# Patient Record
Sex: FEMALE | Race: WHITE | NOT HISPANIC OR LATINO
[De-identification: names, ages, dates, MRNs, and addresses within clinical notes are randomized per-mention and may not be internally consistent; named-entity substitution may affect disease eponyms.]

---

## 2021-02-10 ENCOUNTER — APPOINTMENT (OUTPATIENT)
Dept: UROLOGY | Facility: CLINIC | Age: 61
End: 2021-02-10
Payer: COMMERCIAL

## 2021-02-10 VITALS
WEIGHT: 107 LBS | HEIGHT: 60 IN | DIASTOLIC BLOOD PRESSURE: 94 MMHG | SYSTOLIC BLOOD PRESSURE: 144 MMHG | BODY MASS INDEX: 21.01 KG/M2 | HEART RATE: 52 BPM | TEMPERATURE: 97.6 F

## 2021-02-10 DIAGNOSIS — N39.8 OTHER SPECIFIED DISORDERS OF URINARY SYSTEM: ICD-10-CM

## 2021-02-10 PROCEDURE — 51798 US URINE CAPACITY MEASURE: CPT

## 2021-02-10 PROCEDURE — 99072 ADDL SUPL MATRL&STAF TM PHE: CPT

## 2021-02-10 PROCEDURE — 99204 OFFICE O/P NEW MOD 45 MIN: CPT | Mod: 25

## 2021-02-10 NOTE — LETTER BODY
[Dear  ___] : Dear ~EDGAR, [Courtesy Letter:] : I had the pleasure of seeing your patient, [unfilled], in my office today. [Please see my note below.] : Please see my note below. [Consult Closing:] : Thank you very much for allowing me to participate in the care of this patient.  If you have any questions, please do not hesitate to contact me. [Sincerely,] : Sincerely, [FreeTextEntry2] : Noemi Srinivasan NP\par [address]\[place] [address] [FreeTextEntry3] : Alex Banks MD, FACS\par Urologic Oncology\par Department of Urology\par [facility]\par \par [employer] [facility] \par \par

## 2021-02-10 NOTE — HISTORY OF PRESENT ILLNESS
[Bladder Spasm] : bladder spasm [Weight Loss] : no weight loss [FreeTextEntry1] : This is a 60 year old woman being seen to for UPJ obstruction.  She has had an extensive work up due to her history of melanoma and a 15 pound weight loss last year.\par \par CTU in Jan 2021 demonstrates mild hydronephrosis and delay in nephrogram with suggestion of partial UPJ obstruction.\par Cr is 1.78 back in September 2020 \par NM renal scan shows moderate to sever left sided UPJ obstruction prior to Lasix and partial clearance following Lasix.\par Split function 57 % left and 43 % right \par \par \par She also is concerned about some bladder pain that has been ongoing for years.  If she waits to long to urinate, or empty her bladder she develops spasms after voiding.  The spasms subside without intervention after about 15 minutes.  \par She denies any hematuria, dysuria, or difficulty starting urinary stream \par \par Surgical Hx: melanoma removal right should 2006, sinus surgery x 2\par Social Hx: nonsmoker, occasional ETOH uses, works as an RN \par Family Hx: no urological cancers [Urinary Incontinence] : no urinary incontinence [Urinary Retention] : no urinary retention [Urinary Urgency] : no urinary urgency [Urinary Frequency] : no urinary frequency [Nocturia] : no nocturia [Straining] : no straining [Weak Stream] : no weak stream [Intermittency] : no intermittency [Dysuria] : no dysuria [Hematuria - Gross] : no gross hematuria [Abdominal Pain] : no abdominal pain [Flank Pain] : no flank pain [Fever] : no fever [Fatigue] : no fatigue [Nausea] : no nausea

## 2021-02-10 NOTE — ASSESSMENT
[FreeTextEntry1] : 60 year old woman with recent finding of partial UPJ obstruction after work up for unintentional 15lb weight loss\par -US, MRI, and CT reviewed\par -discussed finding of partial obstruction finding on NM renal scan,  Reviewed split function with patient.\par -PVR today was 0\par -discussed time voiding to help prevent bladder spasms\par -recheck BMP today given elevated creatine from September \par -given finding of partial obstruction can follow up in 1 year with NM renal scan \par \par \par RTC in 1 year

## 2021-02-10 NOTE — END OF VISIT
[FreeTextEntry3] : 61yo female referred for left hydronephrosis, elevated creatinine, dysfunctional voiding. Reviewed CT imaging, mild left UPJ obstruction from crossing vessel, distended bladder. Reviewed NM imaging, delayed excretion from left kidney before Lasix but normal washout after Lasix. T 1/2 of 10 minutes which is normal. No evidence of obstruction but recommend follow up. Check BMP today. F/u 1 year for repeat NM imaging.

## 2021-03-25 ENCOUNTER — APPOINTMENT (OUTPATIENT)
Dept: NEPHROLOGY | Facility: CLINIC | Age: 61
End: 2021-03-25
Payer: COMMERCIAL

## 2021-03-25 VITALS
RESPIRATION RATE: 12 BRPM | BODY MASS INDEX: 15.03 KG/M2 | WEIGHT: 105 LBS | HEART RATE: 63 BPM | HEIGHT: 70 IN | DIASTOLIC BLOOD PRESSURE: 86 MMHG | OXYGEN SATURATION: 98 % | SYSTOLIC BLOOD PRESSURE: 134 MMHG

## 2021-03-25 DIAGNOSIS — Z78.9 OTHER SPECIFIED HEALTH STATUS: ICD-10-CM

## 2021-03-25 PROCEDURE — 99203 OFFICE O/P NEW LOW 30 MIN: CPT

## 2021-03-25 PROCEDURE — 99072 ADDL SUPL MATRL&STAF TM PHE: CPT

## 2021-03-26 PROBLEM — Z78.9 NON-SMOKER: Status: ACTIVE | Noted: 2021-03-26

## 2021-03-26 NOTE — PHYSICAL EXAM
[General Appearance - Alert] : alert [General Appearance - In No Acute Distress] : in no acute distress [Extraocular Movements] : extraocular movements were intact [Neck Appearance] : the appearance of the neck was normal [Jugular Venous Distention Increased] : there was no jugular-venous distention [Exaggerated Use Of Accessory Muscles For Inspiration] : no accessory muscle use [Auscultation Breath Sounds / Voice Sounds] : lungs were clear to auscultation bilaterally [Heart Rate And Rhythm] : heart rate was normal and rhythm regular [Heart Sounds] : normal S1 and S2 [Heart Sounds Gallop] : no gallops [Murmurs] : no murmurs [Heart Sounds Pericardial Friction Rub] : no pericardial rub [Edema] : there was no peripheral edema [Abdomen Soft] : soft [Abdomen Tenderness] : non-tender [No CVA Tenderness] : no ~M costovertebral angle tenderness [Nail Clubbing] : no clubbing  or cyanosis of the fingernails [Oriented To Time, Place, And Person] : oriented to person, place, and time [Affect] : the affect was normal [Sclera] : the sclera and conjunctiva were normal [Abnormal Walk] : normal gait [Involuntary Movements] : no involuntary movements were seen [] : no rash [No Focal Deficits] : no focal deficits

## 2021-03-26 NOTE — CONSULT LETTER
[Dear  ___] : Dear  [unfilled], [Consult Letter:] : I had the pleasure of evaluating your patient, [unfilled]. [Please see my note below.] : Please see my note below. [Consult Closing:] : Thank you very much for allowing me to participate in the care of this patient.  If you have any questions, please do not hesitate to contact me. [Sincerely,] : Sincerely, [FreeTextEntry3] : Jose Araya MD, MINH\par Division of Nephrology\par [facility]\par  of Medicine\par [employer] [facility] [facility] [employer] \par \par \par \par \par

## 2021-03-26 NOTE — ASSESSMENT
[FreeTextEntry1] : CKD Stage 1 \par hydronephrosis, partial L UPJ obstruction presumed due to crossing vessel and not active per  -- good renal fxn last check reportedly -- will need monitoring of renal fxn and of hydro severity(to follow with  for imaging ). Also optimize BP which may be borderline. r/o albuminuria\par \par will obtain CMP, Uric acid, urine microalbumin, UA \par cont  f/u as planned \par BP borderline here and reports can be higher at home -- to follow at home and  report home BPs in diary and bring to next visit, will consider 24 hour ABPM \par \par likely f/u  6 months pending labs and BP

## 2021-03-26 NOTE — REVIEW OF SYSTEMS
[Fever] : no fever [Chills] : no chills [Chest Pain] : no chest pain [Palpitations] : no palpitations [Shortness Of Breath] : no shortness of breath [Wheezing] : no wheezing [Cough] : no cough [SOB on Exertion] : no shortness of breath during exertion [Abdominal Pain] : no abdominal pain [Vomiting] : no vomiting [Constipation] : no constipation [Diarrhea] : no diarrhea [Dysuria] : no dysuria [Joint Pain] : no joint pain [Joint Swelling] : no joint swelling [Dizziness] : no dizziness [Fainting] : no fainting [Anxiety] : no anxiety [Depression] : no depression

## 2021-03-26 NOTE — HISTORY OF PRESENT ILLNESS
[FreeTextEntry1] : 60 WF referred by Dr Valencia for left  hydronephrosis\par  She has had an extensive work up due to her history of melanoma and a 15 pound weight loss last year which started with abdominal US, and then CT and MRI which found hydronephrosis. CTU in Jan 2021 demonstrated mild hydronephrosis and delay in nephrogram with suggestion of partial UPJ obstruction from crossing vessel. . Cr 0.78 in September 2020, had repeat labs while admitted to [facility] in October-November for infected dental implant/sinus infection; does not recall specific labs but knows that labs including Cr were "normal". NM renal scan showed   left sided delay in excretion. . Split function 57 % left and 43 % right.  Felt by  not active obstruction based on normal washout after lasix. .  She has noted lower abdominal spasms that has been ongoing for years and occurs if she waits to long to urinate. The spasms subside without intervention after about 15 minutes. She denies any headache, CP, SOB, nausea/vomiting, foamy urine, hematuria, dysuria, or abdominal pain. \par has no hx of HTN but notes BP often high at home - 130s-140 systolic\par \par Surgical Hx: melanoma removal right shoulder 2006, sinus surgery x2 in 10/2020 and 11/2020 \par Social Hx: nonsmoker, occasional ETOH uses, works as an RN \par Family Hx: no cancers \par \par PCP Dr. Valencia

## 2021-08-30 ENCOUNTER — APPOINTMENT (OUTPATIENT)
Dept: NEPHROLOGY | Facility: CLINIC | Age: 61
End: 2021-08-30
Payer: COMMERCIAL

## 2021-08-30 VITALS
SYSTOLIC BLOOD PRESSURE: 127 MMHG | HEART RATE: 57 BPM | DIASTOLIC BLOOD PRESSURE: 85 MMHG | BODY MASS INDEX: 15.35 KG/M2 | WEIGHT: 107 LBS

## 2021-08-30 DIAGNOSIS — N18.9 CHRONIC KIDNEY DISEASE, UNSPECIFIED: ICD-10-CM

## 2021-08-30 PROCEDURE — 99214 OFFICE O/P EST MOD 30 MIN: CPT

## 2021-08-30 NOTE — ASSESSMENT
[FreeTextEntry1] : chronic left hydro due to UPJ with good overall renal fxn\par Borderline BP -- not bad here but reports avg abt 140/85-90 at home \par nl lytes, no albuminuria\par will try low dose losartan adonis considering potential nephron loss with hydro -- follow BP and recheck lytes after month \par cont  f/u to follow hydro \par f/u 4 mos here

## 2021-08-30 NOTE — HISTORY OF PRESENT ILLNESS
[FreeTextEntry1] : f/u hydro and HTN\par following BP at home and running around 140/85 range -- ranges bet 120-150 systolic but more consisitently 85-90 diastolic\par no complaints \par last labs with nl creat no urine microalbumin \par PCP Dr Valencia\par \par

## 2022-02-23 ENCOUNTER — APPOINTMENT (OUTPATIENT)
Dept: UROLOGY | Facility: CLINIC | Age: 62
End: 2022-02-23
Payer: COMMERCIAL

## 2022-02-23 VITALS
WEIGHT: 111 LBS | HEART RATE: 50 BPM | SYSTOLIC BLOOD PRESSURE: 122 MMHG | TEMPERATURE: 97.6 F | DIASTOLIC BLOOD PRESSURE: 79 MMHG | HEIGHT: 60 IN | BODY MASS INDEX: 21.79 KG/M2

## 2022-02-23 PROCEDURE — 99213 OFFICE O/P EST LOW 20 MIN: CPT

## 2022-02-23 NOTE — ASSESSMENT
[FreeTextEntry1] : 61 year old woman with partial UPJ obstruction \par Reviewed NM renal scan from 2/2022, stable and normal split renal function\par Slightly delayed T 1/2 time compared to a year ago\par No symptoms, normal serum creatinine\par Will repeat NM scan in 1 year\par

## 2022-02-23 NOTE — LETTER BODY
[Dear  ___] : Dear  [unfilled], [Courtesy Letter:] : I had the pleasure of seeing your patient, [unfilled], in my office today. [Please see my note below.] : Please see my note below. [Consult Closing:] : Thank you very much for allowing me to participate in the care of this patient.  If you have any questions, please do not hesitate to contact me. [Sincerely,] : Sincerely, [FreeTextEntry2] : Cristopher Valencia DO\par [address]\par [place] [id number] [FreeTextEntry3] : Alex Banks MD, FACS\par Urologic Oncology\par Department of Urology\par [facility]\par \par [employer] [facility] \par \par

## 2022-02-23 NOTE — HISTORY OF PRESENT ILLNESS
[FreeTextEntry1] : This is a 60 year old woman being seen to for UPJ obstruction.  She has had an extensive work up due to her history of melanoma and a 15 pound weight loss last year.\par \par CTU in Jan 2021 demonstrates mild hydronephrosis and delay in nephrogram with suggestion of partial UPJ obstruction.\par Cr is 1.78 back in September 2020 \par NM renal scan shows moderate to severe left sided UPJ obstruction prior to Lasix and partial clearance following Lasix.\par Split function 57 % left and 43 % right \par \par 2/23/22 Here for f/u. Repeat NM scan shows stable split function 57 % left and 43 % right, T 1/2 time on left is intermediate, 16 minutes, was 10 minutes 1 year ago. Last creatinine = 0.8. No flank pain.  [Urinary Incontinence] : no urinary incontinence [Urinary Retention] : no urinary retention [Urinary Urgency] : no urinary urgency [Urinary Frequency] : no urinary frequency [Nocturia] : no nocturia [Straining] : no straining [Weak Stream] : no weak stream [Intermittency] : no intermittency [Dysuria] : no dysuria [Hematuria - Gross] : no gross hematuria [Bladder Spasm] : no bladder spasm [Abdominal Pain] : no abdominal pain [Flank Pain] : no flank pain [Weight Loss] : no weight loss [Fever] : no fever [Fatigue] : no fatigue [Nausea] : no nausea

## 2022-03-04 LAB
ALBUMIN SERPL ELPH-MCNC: 4.6 G/DL
ALP BLD-CCNC: 89 U/L
ALT SERPL-CCNC: 18 U/L
ANION GAP SERPL CALC-SCNC: 9 MMOL/L
APPEARANCE: CLEAR
AST SERPL-CCNC: 31 U/L
BACTERIA: NEGATIVE
BILIRUB SERPL-MCNC: 0.3 MG/DL
BILIRUBIN URINE: NEGATIVE
BLOOD URINE: NEGATIVE
BUN SERPL-MCNC: 12 MG/DL
CALCIUM SERPL-MCNC: 9.7 MG/DL
CHLORIDE SERPL-SCNC: 105 MMOL/L
CO2 SERPL-SCNC: 28 MMOL/L
COLOR: NORMAL
CREAT SERPL-MCNC: 0.76 MG/DL
CREAT SPEC-SCNC: 70 MG/DL
GLUCOSE QUALITATIVE U: NEGATIVE
GLUCOSE SERPL-MCNC: 80 MG/DL
HYALINE CASTS: 0 /LPF
KETONES URINE: NEGATIVE
LEUKOCYTE ESTERASE URINE: NEGATIVE
MICROALBUMIN 24H UR DL<=1MG/L-MCNC: <1.2 MG/DL
MICROALBUMIN/CREAT 24H UR-RTO: NORMAL MG/G
MICROSCOPIC-UA: NORMAL
NITRITE URINE: NEGATIVE
PH URINE: 6.5
POTASSIUM SERPL-SCNC: 4.1 MMOL/L
PROT SERPL-MCNC: 7.1 G/DL
PROTEIN URINE: NEGATIVE
RED BLOOD CELLS URINE: 2 /HPF
SODIUM SERPL-SCNC: 142 MMOL/L
SPECIFIC GRAVITY URINE: 1.02
SQUAMOUS EPITHELIAL CELLS: 1 /HPF
URATE SERPL-MCNC: 5.9 MG/DL
UROBILINOGEN URINE: NORMAL
WHITE BLOOD CELLS URINE: 1 /HPF

## 2022-03-07 ENCOUNTER — NON-APPOINTMENT (OUTPATIENT)
Age: 62
End: 2022-03-07

## 2022-03-07 ENCOUNTER — APPOINTMENT (OUTPATIENT)
Dept: NEPHROLOGY | Facility: CLINIC | Age: 62
End: 2022-03-07
Payer: COMMERCIAL

## 2022-03-07 VITALS
WEIGHT: 112 LBS | DIASTOLIC BLOOD PRESSURE: 72 MMHG | HEART RATE: 60 BPM | SYSTOLIC BLOOD PRESSURE: 108 MMHG | BODY MASS INDEX: 21.87 KG/M2

## 2022-03-07 PROCEDURE — 99213 OFFICE O/P EST LOW 20 MIN: CPT

## 2022-03-07 RX ORDER — LOSARTAN POTASSIUM 25 MG/1
25 TABLET, FILM COATED ORAL DAILY
Qty: 30 | Refills: 5 | Status: ACTIVE | COMMUNITY
Start: 2021-08-30 | End: 1900-01-01

## 2022-03-07 NOTE — HISTORY OF PRESENT ILLNESS
[FreeTextEntry1] : f/u hydronephrosis, HTN \par no complaints\par on losartan since last visit and reports has worked very well for BP -- running bt 110 -120 systolic with it (down from 140) --no dizziness\par meds reviewed \par did not do labs \par saw  - renal scan with stable delayed emptyiong let kidney -- told cont yearly f/u\par PCP Dr Valencia\par \par

## 2022-03-07 NOTE — ASSESSMENT
[FreeTextEntry1] : chronic left hydro due to UPJ obstruction -- has had good renal fxn\par BP much better on low dose ARB\par check labs to assure renal fxn stable\par f/u 6 mos if ok

## 2022-03-08 LAB
ALBUMIN SERPL ELPH-MCNC: 4.6 G/DL
ALP BLD-CCNC: 76 U/L
ALT SERPL-CCNC: 20 U/L
ANION GAP SERPL CALC-SCNC: 12 MMOL/L
APPEARANCE: CLEAR
AST SERPL-CCNC: 30 U/L
BACTERIA: NEGATIVE
BILIRUB SERPL-MCNC: 0.4 MG/DL
BILIRUBIN URINE: NEGATIVE
BLOOD URINE: NEGATIVE
BUN SERPL-MCNC: 11 MG/DL
CALCIUM SERPL-MCNC: 9.8 MG/DL
CHLORIDE SERPL-SCNC: 106 MMOL/L
CO2 SERPL-SCNC: 25 MMOL/L
COLOR: NORMAL
CREAT SERPL-MCNC: 0.85 MG/DL
CREAT SPEC-SCNC: 26 MG/DL
EGFR: 77 ML/MIN/1.73M2
GLUCOSE QUALITATIVE U: NEGATIVE
GLUCOSE SERPL-MCNC: 87 MG/DL
HYALINE CASTS: 0 /LPF
KETONES URINE: NEGATIVE
LEUKOCYTE ESTERASE URINE: NEGATIVE
MICROALBUMIN 24H UR DL<=1MG/L-MCNC: <1.2 MG/DL
MICROALBUMIN/CREAT 24H UR-RTO: NORMAL MG/G
MICROSCOPIC-UA: NORMAL
NITRITE URINE: NEGATIVE
PH URINE: 6.5
POTASSIUM SERPL-SCNC: 4.3 MMOL/L
PROT SERPL-MCNC: 6.6 G/DL
PROTEIN URINE: NEGATIVE
RED BLOOD CELLS URINE: 0 /HPF
SODIUM SERPL-SCNC: 143 MMOL/L
SPECIFIC GRAVITY URINE: 1.01
SQUAMOUS EPITHELIAL CELLS: 1 /HPF
UROBILINOGEN URINE: NORMAL
WHITE BLOOD CELLS URINE: 1 /HPF

## 2022-11-02 ENCOUNTER — APPOINTMENT (OUTPATIENT)
Dept: NEPHROLOGY | Facility: CLINIC | Age: 62
End: 2022-11-02

## 2022-11-02 VITALS
BODY MASS INDEX: 22.07 KG/M2 | HEART RATE: 62 BPM | WEIGHT: 113 LBS | SYSTOLIC BLOOD PRESSURE: 118 MMHG | DIASTOLIC BLOOD PRESSURE: 76 MMHG

## 2022-11-02 VITALS — DIASTOLIC BLOOD PRESSURE: 72 MMHG | SYSTOLIC BLOOD PRESSURE: 114 MMHG

## 2022-11-02 PROCEDURE — 99214 OFFICE O/P EST MOD 30 MIN: CPT

## 2022-11-02 RX ORDER — ROSUVASTATIN CALCIUM 10 MG/1
10 TABLET, FILM COATED ORAL
Qty: 30 | Refills: 5 | Status: ACTIVE | COMMUNITY
Start: 2022-09-30

## 2022-11-02 NOTE — HISTORY OF PRESENT ILLNESS
[FreeTextEntry1] : f/u hydronephrosis , HTN \par no complaints \par notes BP high prior to taking am losartan --  140/90s but then goes to 120s/70s after takes it -- forgot to take med today \par meds reviewed with pt and list updated\par labs done and reviewed - see below\par PCP Dr Valencia\par

## 2022-11-02 NOTE — ASSESSMENT
[FreeTextEntry1] : chronic left hydro due to UPJ obstruction -- has had good renal fxn\par no albuminuria \par reports high BP at home prior to med but excellent here and didn’t take med today \par discussed 24 hour ABPM to r/o masked HTN--would like to follow BPs more carefully on her own for now and will touch base if confirms that still high. If so may do losartan BID or longer acting ARB \par f/u 6 mos if ok --earlier if requires BP med change\par

## 2023-02-22 ENCOUNTER — APPOINTMENT (OUTPATIENT)
Dept: UROLOGY | Facility: CLINIC | Age: 63
End: 2023-02-22

## 2023-03-10 ENCOUNTER — NON-APPOINTMENT (OUTPATIENT)
Age: 63
End: 2023-03-10

## 2023-03-13 ENCOUNTER — TRANSCRIPTION ENCOUNTER (OUTPATIENT)
Age: 63
End: 2023-03-13

## 2023-03-13 ENCOUNTER — APPOINTMENT (OUTPATIENT)
Dept: UROLOGY | Facility: CLINIC | Age: 63
End: 2023-03-13
Payer: MEDICARE

## 2023-03-13 VITALS — TEMPERATURE: 94.3 F | SYSTOLIC BLOOD PRESSURE: 151 MMHG | HEART RATE: 47 BPM | DIASTOLIC BLOOD PRESSURE: 90 MMHG

## 2023-03-13 PROCEDURE — 99214 OFFICE O/P EST MOD 30 MIN: CPT

## 2023-03-13 NOTE — END OF VISIT
[FreeTextEntry3] : I, Dr. Banks, personally performed the evaluation and management (E/M) services for this established patient who presents today with (a) new problem(s)/exacerbation of (an) existing condition(s).  That E/M includes conducting the examination, assessing all new/exacerbated conditions, and establishing a new plan of care.  Today, our ACP, Toney Cerda, was here to observe the evaluation and management services for this new problem/exacerbated condition to be followed going forward.\par

## 2023-03-13 NOTE — ASSESSMENT
[FreeTextEntry1] : 62 year old woman with partial UPJ obstruction \par Reviewed NM renal scan from 2/2023 imaging and report\par R Kidney stable, L kidney demonstrating slight worsening 1/2 T from 16 last year to 20.8 with current evaluation. \par Discussed with her findings, that this is borderline on indications for intervention\par Considering her age, good renal function and lack of symptoms, will observe\par Will repeat NM scan in 1 year\par

## 2023-03-13 NOTE — PHYSICAL EXAM
[Normal Appearance] : normal appearance [General Appearance - In No Acute Distress] : no acute distress [Skin Color & Pigmentation] : normal skin color and pigmentation [Respiration, Rhythm And Depth] : normal respiratory rhythm and effort [Exaggerated Use Of Accessory Muscles For Inspiration] : no accessory muscle use [Oriented To Time, Place, And Person] : oriented to person, place, and time

## 2023-03-13 NOTE — HISTORY OF PRESENT ILLNESS
[FreeTextEntry1] : This is a 60 year old woman being seen to for UPJ obstruction.  She has had an extensive work up due to her history of melanoma and a 15 pound weight loss last year.\par \par CTU in Jan 2021 demonstrates mild hydronephrosis and delay in nephrogram with suggestion of partial UPJ obstruction.\par Cr is 1.78 back in September 2020 \par NM renal scan shows moderate to severe left sided UPJ obstruction prior to Lasix and partial clearance following Lasix.\par Split function 57 % left and 43 % right \par \par 2/23/22 Here for f/u. Repeat NM scan shows stable split function 57 % left and 43 % right, T 1/2 time on left is intermediate, 16 minutes, was 10 minutes 1 year ago. Last creatinine = 0.8. No flank pain. \par \par 3/13/2023 Here for f/u, with no complaints. Repeat NM scan  (2/2023) shows Right kidney stable, left kidney with slowly worsening 1/2 T. 20.8 mins, last year was 16. Patient without flank pain. Followed by Dr. Araya. [Urinary Incontinence] : no urinary incontinence [Urinary Retention] : no urinary retention [Urinary Urgency] : no urinary urgency [Urinary Frequency] : no urinary frequency [Nocturia] : no nocturia [Straining] : no straining [Weak Stream] : no weak stream [Intermittency] : no intermittency [Dysuria] : no dysuria [Hematuria - Gross] : no gross hematuria [Bladder Spasm] : no bladder spasm [Abdominal Pain] : no abdominal pain [Flank Pain] : no flank pain [Weight Loss] : no recent ~M [unfilled] weight loss [Fever] : no fever [Fatigue] : no fatigue [Nausea] : no nausea

## 2023-05-12 LAB
ALBUMIN SERPL ELPH-MCNC: 4.6 G/DL
ALP BLD-CCNC: 70 U/L
ALT SERPL-CCNC: 19 U/L
ANION GAP SERPL CALC-SCNC: 14 MMOL/L
AST SERPL-CCNC: 27 U/L
BILIRUB SERPL-MCNC: 0.2 MG/DL
BUN SERPL-MCNC: 14 MG/DL
CALCIUM SERPL-MCNC: 9.6 MG/DL
CHLORIDE SERPL-SCNC: 105 MMOL/L
CO2 SERPL-SCNC: 26 MMOL/L
CREAT SERPL-MCNC: 0.72 MG/DL
EGFR: 93 ML/MIN/1.73M2
GLUCOSE SERPL-MCNC: 80 MG/DL
MAGNESIUM SERPL-MCNC: 2.2 MG/DL
POTASSIUM SERPL-SCNC: 4.3 MMOL/L
PROT SERPL-MCNC: 6.9 G/DL
SODIUM SERPL-SCNC: 144 MMOL/L
URATE SERPL-MCNC: 4 MG/DL

## 2023-05-14 LAB
CREAT SPEC-SCNC: 21 MG/DL
MICROALBUMIN 24H UR DL<=1MG/L-MCNC: <1.2 MG/DL
MICROALBUMIN/CREAT 24H UR-RTO: NORMAL MG/G

## 2023-05-16 ENCOUNTER — APPOINTMENT (OUTPATIENT)
Dept: NEPHROLOGY | Facility: CLINIC | Age: 63
End: 2023-05-16
Payer: MEDICARE

## 2023-05-16 PROCEDURE — 99214 OFFICE O/P EST MOD 30 MIN: CPT

## 2023-05-16 NOTE — HISTORY OF PRESENT ILLNESS
[FreeTextEntry1] : f/u left hydro from UPJ obstruction \par noted to have slt more prolonged diuretic response on left on latest renal scan -- per  cont moniotring yearly scan\par no complaints\par no flank,  sx\par has been monitoring home BP and running 110-120s/70s-80s generally\par labs done and reviewed - see below\par PCP Dr Valencia\par  Dr Banks\par \par

## 2023-05-16 NOTE — ASSESSMENT
[FreeTextEntry1] : chronic left hydro due to UPJ-- undergoing monitoring \par good renal fxn\par no symptoms\par BP well controlled  w/o albuminuria \par f/u one year  incl  f/u

## 2023-09-05 ENCOUNTER — APPOINTMENT (OUTPATIENT)
Dept: INTERNAL MEDICINE | Facility: CLINIC | Age: 63
End: 2023-09-05
Payer: MEDICARE

## 2023-09-05 ENCOUNTER — NON-APPOINTMENT (OUTPATIENT)
Age: 63
End: 2023-09-05

## 2023-09-05 VITALS
HEIGHT: 60 IN | OXYGEN SATURATION: 100 % | WEIGHT: 115 LBS | SYSTOLIC BLOOD PRESSURE: 119 MMHG | HEART RATE: 67 BPM | DIASTOLIC BLOOD PRESSURE: 72 MMHG | BODY MASS INDEX: 22.58 KG/M2

## 2023-09-05 DIAGNOSIS — Z83.6 FAMILY HISTORY OF OTHER DISEASES OF THE RESPIRATORY SYSTEM: ICD-10-CM

## 2023-09-05 DIAGNOSIS — Z12.39 ENCOUNTER FOR OTHER SCREENING FOR MALIGNANT NEOPLASM OF BREAST: ICD-10-CM

## 2023-09-05 DIAGNOSIS — N13.30 UNSPECIFIED HYDRONEPHROSIS: ICD-10-CM

## 2023-09-05 DIAGNOSIS — N13.5 CROSSING VESSEL AND STRICTURE OF URETER W/OUT HYDRONEPHROSIS: ICD-10-CM

## 2023-09-05 DIAGNOSIS — Z85.820 PERSONAL HISTORY OF MALIGNANT MELANOMA OF SKIN: ICD-10-CM

## 2023-09-05 DIAGNOSIS — Z82.0 FAMILY HISTORY OF EPILEPSY AND OTHER DISEASES OF THE NERVOUS SYSTEM: ICD-10-CM

## 2023-09-05 DIAGNOSIS — M25.551 PAIN IN RIGHT HIP: ICD-10-CM

## 2023-09-05 DIAGNOSIS — R91.8 OTHER NONSPECIFIC ABNORMAL FINDING OF LUNG FIELD: ICD-10-CM

## 2023-09-05 DIAGNOSIS — Z00.00 ENCOUNTER FOR GENERAL ADULT MEDICAL EXAMINATION W/OUT ABNORMAL FINDINGS: ICD-10-CM

## 2023-09-05 DIAGNOSIS — I10 ESSENTIAL (PRIMARY) HYPERTENSION: ICD-10-CM

## 2023-09-05 PROCEDURE — 36415 COLL VENOUS BLD VENIPUNCTURE: CPT

## 2023-09-05 PROCEDURE — 99213 OFFICE O/P EST LOW 20 MIN: CPT | Mod: 25

## 2023-09-05 PROCEDURE — G0402 INITIAL PREVENTIVE EXAM: CPT

## 2023-09-05 PROCEDURE — G0438: CPT

## 2023-09-05 PROCEDURE — 93000 ELECTROCARDIOGRAM COMPLETE: CPT

## 2023-09-05 NOTE — HISTORY OF PRESENT ILLNESS
[de-identified] : Needs repeat Low Dose chest CT.  Feels stamina declining.  No CROWDER/SOB.  No CP.  No nausea, vomiting, diarrhea, and constipation. No chest pain or shortness of breath. No change in weight.  Exercises, runs 4-5 mile a day.  Patient complaining of right hip pain for more than 4 weeks.

## 2023-09-05 NOTE — HEALTH RISK ASSESSMENT
[Very Good] : ~his/her~  mood as very good [No] : No [No falls in past year] : Patient reported no falls in the past year [0] : 2) Feeling down, depressed, or hopeless: Not at all (0) [PHQ-2 Negative - No further assessment needed] : PHQ-2 Negative - No further assessment needed [Patient reported mammogram was normal] : Patient reported mammogram was normal [Patient reported colonoscopy was normal] : Patient reported colonoscopy was normal [None] : None [Alone] : lives alone [Employed] : employed [# Of Children ___] : has [unfilled] children [Fully functional (bathing, dressing, toileting, transferring, walking, feeding)] : Fully functional (bathing, dressing, toileting, transferring, walking, feeding) [Fully functional (using the telephone, shopping, preparing meals, housekeeping, doing laundry, using] : Fully functional and needs no help or supervision to perform IADLs (using the telephone, shopping, preparing meals, housekeeping, doing laundry, using transportation, managing medications and managing finances) [Never] : Never [[id number]] : 0 [MammogramDate] : 01/21 [ColonoscopyDate] : 01/19 [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

## 2023-09-19 LAB
ALBUMIN SERPL ELPH-MCNC: 4.7 G/DL
ALP BLD-CCNC: 75 U/L
ALT SERPL-CCNC: 20 U/L
ANION GAP SERPL CALC-SCNC: 11 MMOL/L
AST SERPL-CCNC: 32 U/L
BASOPHILS # BLD AUTO: 0.05 K/UL
BASOPHILS NFR BLD AUTO: 1 %
BILIRUB SERPL-MCNC: 0.3 MG/DL
BUN SERPL-MCNC: 23 MG/DL
CALCIUM SERPL-MCNC: 9.9 MG/DL
CHLORIDE SERPL-SCNC: 103 MMOL/L
CHOLEST SERPL-MCNC: 245 MG/DL
CO2 SERPL-SCNC: 27 MMOL/L
CREAT SERPL-MCNC: 1.01 MG/DL
EGFR: 62 ML/MIN/1.73M2
EOSINOPHIL # BLD AUTO: 0.16 K/UL
EOSINOPHIL NFR BLD AUTO: 3 %
FERRITIN SERPL-MCNC: 44 NG/ML
FOLATE SERPL-MCNC: 11.4 NG/ML
GLUCOSE SERPL-MCNC: 96 MG/DL
HCT VFR BLD CALC: 42.1 %
HDLC SERPL-MCNC: 88 MG/DL
HGB BLD-MCNC: 13.4 G/DL
IMM GRANULOCYTES NFR BLD AUTO: 0.2 %
LDLC SERPL CALC-MCNC: 139 MG/DL
LYMPHOCYTES # BLD AUTO: 1.13 K/UL
LYMPHOCYTES NFR BLD AUTO: 21.5 %
MAN DIFF?: NORMAL
MCHC RBC-ENTMCNC: 31 PG
MCHC RBC-ENTMCNC: 31.8 GM/DL
MCV RBC AUTO: 97.5 FL
MONOCYTES # BLD AUTO: 0.38 K/UL
MONOCYTES NFR BLD AUTO: 7.2 %
NEUTROPHILS # BLD AUTO: 3.53 K/UL
NEUTROPHILS NFR BLD AUTO: 67.1 %
NONHDLC SERPL-MCNC: 157 MG/DL
PLATELET # BLD AUTO: 203 K/UL
POTASSIUM SERPL-SCNC: 4.4 MMOL/L
PROT SERPL-MCNC: 7.2 G/DL
RBC # BLD: 4.32 M/UL
RBC # FLD: 13.6 %
SODIUM SERPL-SCNC: 141 MMOL/L
TRIGL SERPL-MCNC: 104 MG/DL
TSH SERPL-ACNC: 3.13 UIU/ML
VIT B12 SERPL-MCNC: 704 PG/ML
WBC # FLD AUTO: 5.26 K/UL

## 2023-11-09 ENCOUNTER — APPOINTMENT (OUTPATIENT)
Dept: OPHTHALMOLOGY | Facility: CLINIC | Age: 63
End: 2023-11-09
Payer: MEDICARE

## 2023-11-09 ENCOUNTER — NON-APPOINTMENT (OUTPATIENT)
Age: 63
End: 2023-11-09

## 2023-11-09 PROCEDURE — 92004 COMPRE OPH EXAM NEW PT 1/>: CPT

## 2023-11-09 PROCEDURE — 92134 CPTRZ OPH DX IMG PST SGM RTA: CPT

## 2023-12-12 ENCOUNTER — APPOINTMENT (OUTPATIENT)
Dept: OPHTHALMOLOGY | Facility: CLINIC | Age: 63
End: 2023-12-12
Payer: MEDICARE

## 2023-12-12 ENCOUNTER — NON-APPOINTMENT (OUTPATIENT)
Age: 63
End: 2023-12-12

## 2023-12-12 PROCEDURE — 76512 OPH US DX B-SCAN: CPT | Mod: LT

## 2023-12-12 PROCEDURE — 92014 COMPRE OPH EXAM EST PT 1/>: CPT

## 2023-12-12 PROCEDURE — 92250 FUNDUS PHOTOGRAPHY W/I&R: CPT

## 2024-02-22 ENCOUNTER — APPOINTMENT (OUTPATIENT)
Dept: UROLOGY | Facility: CLINIC | Age: 64
End: 2024-02-22
Payer: MEDICARE

## 2024-02-22 VITALS
DIASTOLIC BLOOD PRESSURE: 83 MMHG | SYSTOLIC BLOOD PRESSURE: 122 MMHG | OXYGEN SATURATION: 96 % | HEIGHT: 60 IN | BODY MASS INDEX: 22.58 KG/M2 | HEART RATE: 65 BPM | WEIGHT: 115 LBS

## 2024-02-22 PROCEDURE — 99212 OFFICE O/P EST SF 10 MIN: CPT

## 2024-02-22 NOTE — HISTORY OF PRESENT ILLNESS
[FreeTextEntry1] : Dr. Jose Link  CC: UPJO  Doing well today. Initially diagnosed after losing weight.  No pain, experiences minor discomfort in lower abdomen if she waits to long to void.  Describes it as " bladder spams"  discomfort never radiates up flank area  Cr 1.01 mg/dL on 9/2023  Repeat NM scan (2/2023) shows Right kidney stable, left kidney with slowly worsening 1/2 T. 20.8 mins, last year was 16. Patient without flank pain. Followed by Dr. Araya and will be seeing him in May 2024.  Plan from Dr. 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

## 2024-02-22 NOTE — ASSESSMENT
[FreeTextEntry1] : Diagnosis UPJO  Plan  MAG3 with Lasix and return to discuss/interpret  Aung Christine MD, FACS, FRCS  of Urology [facility] Director of Laparoscopic and Robotic Surgery [facility] Director of Urology, [facility] Professor of Urology   (Office) [phone] (Cell)  [phone] [email or web address] The total amount of time I have personally spent preparing for this visit, reviewing the patient's test results, obtaining external history, ordering tests/medications, documenting clinical information, communicating with and counseling the patient/family and/or caregiver(s), and spent face to face with the patient explaining the above was minutes =15

## 2024-04-30 ENCOUNTER — APPOINTMENT (OUTPATIENT)
Dept: UROLOGY | Facility: CLINIC | Age: 64
End: 2024-04-30
Payer: MEDICARE

## 2024-04-30 VITALS
BODY MASS INDEX: 22.58 KG/M2 | HEART RATE: 56 BPM | OXYGEN SATURATION: 99 % | HEIGHT: 60 IN | DIASTOLIC BLOOD PRESSURE: 82 MMHG | WEIGHT: 115 LBS | SYSTOLIC BLOOD PRESSURE: 133 MMHG | TEMPERATURE: 98.2 F

## 2024-04-30 PROCEDURE — 99212 OFFICE O/P EST SF 10 MIN: CPT

## 2024-04-30 NOTE — HISTORY OF PRESENT ILLNESS
[FreeTextEntry1] : CC: UPJO  Patient feeling well  Recent follow up MAG3 renal scan personally reviewed and independently interpreted; no obstruction bilaterally   No symptoms of obstruction/colic   Plan to observe, follow up with US in one year   Aung Christine MD, FACS, FRCS  of Urology [facility] Director of Laparoscopic and Robotic Surgery [facility] Director of Urology, [facility] Professor of Urology   (Office) [phone] (Cell)  [phone] [email or web address] The total amount of time I have personally spent preparing for this visit, reviewing the patient's test results, obtaining external history, ordering tests/medications, documenting clinical information, communicating with and counseling the patient/family and/or caregiver(s), and spent face to face with the patient explaining the above was minutes =10

## 2024-11-14 ENCOUNTER — OUTPATIENT (OUTPATIENT)
Dept: OUTPATIENT SERVICES | Facility: HOSPITAL | Age: 64
LOS: 1 days | End: 2024-11-14
Payer: COMMERCIAL

## 2024-11-14 PROCEDURE — 71046 X-RAY EXAM CHEST 2 VIEWS: CPT

## 2024-11-14 PROCEDURE — 71046 X-RAY EXAM CHEST 2 VIEWS: CPT | Mod: 26

## 2024-12-20 ENCOUNTER — APPOINTMENT (OUTPATIENT)
Dept: OPHTHALMOLOGY | Facility: CLINIC | Age: 64
End: 2024-12-20

## 2025-05-06 ENCOUNTER — APPOINTMENT (OUTPATIENT)
Dept: UROLOGY | Facility: CLINIC | Age: 65
End: 2025-05-06
Payer: MEDICARE

## 2025-05-06 PROCEDURE — 76775 US EXAM ABDO BACK WALL LIM: CPT

## 2025-05-06 PROCEDURE — 99213 OFFICE O/P EST LOW 20 MIN: CPT
